# Patient Record
Sex: FEMALE | URBAN - METROPOLITAN AREA
[De-identification: names, ages, dates, MRNs, and addresses within clinical notes are randomized per-mention and may not be internally consistent; named-entity substitution may affect disease eponyms.]

---

## 2021-07-19 ENCOUNTER — APPOINTMENT (RX ONLY)
Dept: URBAN - METROPOLITAN AREA CLINIC 56 | Facility: CLINIC | Age: 26
Setting detail: DERMATOLOGY
End: 2021-07-19

## 2021-07-19 DIAGNOSIS — L70.0 ACNE VULGARIS: ICD-10-CM

## 2021-07-19 DIAGNOSIS — Z71.89 OTHER SPECIFIED COUNSELING: ICD-10-CM

## 2021-07-19 PROCEDURE — ? PRESCRIPTION

## 2021-07-19 PROCEDURE — ? SUNSCREEN RECOMMENDATIONS

## 2021-07-19 PROCEDURE — ? TREATMENT REGIMEN

## 2021-07-19 PROCEDURE — 99203 OFFICE O/P NEW LOW 30 MIN: CPT

## 2021-07-19 RX ORDER — TRETINOIN 0.5 MG/G
GEL TOPICAL
Qty: 1 | Refills: 6 | Status: ERX | COMMUNITY
Start: 2021-07-19

## 2021-07-19 RX ORDER — AMPICILLIN 500 MG/1
CAPSULE ORAL
Qty: 60 | Refills: 4 | Status: ERX | COMMUNITY
Start: 2021-07-19

## 2021-07-19 RX ADMIN — AMPICILLIN: 500 CAPSULE ORAL at 00:00

## 2021-07-19 RX ADMIN — TRETINOIN: 0.5 GEL TOPICAL at 00:00

## 2021-07-19 NOTE — PROCEDURE: TREATMENT REGIMEN
Initiate Treatment: Ampicillin 500 mg by mouth twice a day and topical tretinoin 0.05% gel daily at bedtime to areas of acne.  She will continue with CereVe acne cleansing products.  Return appointment was scheduled in 2 months for recheck.  If she is not significantly improved at that time patient will likely consent to treatment with isotretinoin.  I told her the postinflammatory pigmentation would be dealt with after her acne is under control.
Continue Regimen: CereVe
Detail Level: Zone

## 2022-07-18 NOTE — PROCEDURE: SUNSCREEN RECOMMENDATIONS
Pt discharged home with parent/guardian. Pt acting age appropriately, respirations regular and unlabored, cap refill less than two seconds. Skin pink, dry and warm. Lungs clear bilaterally. No further complaints at this time. Parent/guardian verbalized understanding of discharge paperwork and has no further questions at this time. Education provided about continuation of care, follow up care and medication administration. Parent/guardian able to provide teach back about discharge instructions.
General Sunscreen Counseling: Patient will use sunscreen lotion and protection unexposed areas during the bhavesh months of the year.  Patient should use an SPF #50 or higher sunscreen product.
Detail Level: Generalized